# Patient Record
Sex: MALE | Race: AMERICAN INDIAN OR ALASKA NATIVE
[De-identification: names, ages, dates, MRNs, and addresses within clinical notes are randomized per-mention and may not be internally consistent; named-entity substitution may affect disease eponyms.]

---

## 2019-02-10 ENCOUNTER — HOSPITAL ENCOUNTER (INPATIENT)
Dept: HOSPITAL 5 - ED | Age: 64
LOS: 3 days | Discharge: HOME | DRG: 314 | End: 2019-02-13
Attending: INTERNAL MEDICINE | Admitting: INTERNAL MEDICINE
Payer: COMMERCIAL

## 2019-02-10 DIAGNOSIS — Z23: ICD-10-CM

## 2019-02-10 DIAGNOSIS — I11.0: ICD-10-CM

## 2019-02-10 DIAGNOSIS — J96.21: ICD-10-CM

## 2019-02-10 DIAGNOSIS — M54.9: ICD-10-CM

## 2019-02-10 DIAGNOSIS — Z72.89: ICD-10-CM

## 2019-02-10 DIAGNOSIS — I95.9: Primary | ICD-10-CM

## 2019-02-10 DIAGNOSIS — Z99.81: ICD-10-CM

## 2019-02-10 DIAGNOSIS — G89.29: ICD-10-CM

## 2019-02-10 DIAGNOSIS — F17.210: ICD-10-CM

## 2019-02-10 DIAGNOSIS — J44.1: ICD-10-CM

## 2019-02-10 DIAGNOSIS — K52.9: ICD-10-CM

## 2019-02-10 DIAGNOSIS — I50.9: ICD-10-CM

## 2019-02-10 DIAGNOSIS — Z71.6: ICD-10-CM

## 2019-02-10 DIAGNOSIS — K21.9: ICD-10-CM

## 2019-02-10 DIAGNOSIS — E11.9: ICD-10-CM

## 2019-02-10 LAB
APTT BLD: 26.5 SEC. (ref 24.2–36.6)
BASOPHILS # (AUTO): 0.1 K/MM3 (ref 0–0.1)
BASOPHILS NFR BLD AUTO: 0.8 % (ref 0–1.8)
BUN SERPL-MCNC: 11 MG/DL (ref 9–20)
BUN/CREAT SERPL: 12 %
CALCIUM SERPL-MCNC: 8.9 MG/DL (ref 8.4–10.2)
EOSINOPHIL # BLD AUTO: 0.1 K/MM3 (ref 0–0.4)
EOSINOPHIL NFR BLD AUTO: 1.5 % (ref 0–4.3)
HCT VFR BLD CALC: 46.8 % (ref 35.5–45.6)
HEMOLYSIS INDEX: 12
HGB BLD-MCNC: 16.1 GM/DL (ref 11.8–15.2)
INR PPP: 0.98 (ref 0.87–1.13)
LYMPHOCYTES # BLD AUTO: 2.6 K/MM3 (ref 1.2–5.4)
LYMPHOCYTES NFR BLD AUTO: 35.9 % (ref 13.4–35)
MCHC RBC AUTO-ENTMCNC: 35 % (ref 32–34)
MCV RBC AUTO: 96 FL (ref 84–94)
MONOCYTES # (AUTO): 0.3 K/MM3 (ref 0–0.8)
MONOCYTES % (AUTO): 4.4 % (ref 0–7.3)
PLATELET # BLD: 294 K/MM3 (ref 140–440)
RBC # BLD AUTO: 4.86 M/MM3 (ref 3.65–5.03)

## 2019-02-10 PROCEDURE — 83880 ASSAY OF NATRIURETIC PEPTIDE: CPT

## 2019-02-10 PROCEDURE — 99291 CRITICAL CARE FIRST HOUR: CPT

## 2019-02-10 PROCEDURE — 93306 TTE W/DOPPLER COMPLETE: CPT

## 2019-02-10 PROCEDURE — A9502 TC99M TETROFOSMIN: HCPCS

## 2019-02-10 PROCEDURE — 93017 CV STRESS TEST TRACING ONLY: CPT

## 2019-02-10 PROCEDURE — 81001 URINALYSIS AUTO W/SCOPE: CPT

## 2019-02-10 PROCEDURE — 84484 ASSAY OF TROPONIN QUANT: CPT

## 2019-02-10 PROCEDURE — 93010 ELECTROCARDIOGRAM REPORT: CPT

## 2019-02-10 PROCEDURE — 90686 IIV4 VACC NO PRSV 0.5 ML IM: CPT

## 2019-02-10 PROCEDURE — 96374 THER/PROPH/DIAG INJ IV PUSH: CPT

## 2019-02-10 PROCEDURE — 36415 COLL VENOUS BLD VENIPUNCTURE: CPT

## 2019-02-10 PROCEDURE — 85610 PROTHROMBIN TIME: CPT

## 2019-02-10 PROCEDURE — 80061 LIPID PANEL: CPT

## 2019-02-10 PROCEDURE — 85379 FIBRIN DEGRADATION QUANT: CPT

## 2019-02-10 PROCEDURE — 85730 THROMBOPLASTIN TIME PARTIAL: CPT

## 2019-02-10 PROCEDURE — 93005 ELECTROCARDIOGRAM TRACING: CPT

## 2019-02-10 PROCEDURE — 94640 AIRWAY INHALATION TREATMENT: CPT

## 2019-02-10 PROCEDURE — 85025 COMPLETE CBC W/AUTO DIFF WBC: CPT

## 2019-02-10 PROCEDURE — 83036 HEMOGLOBIN GLYCOSYLATED A1C: CPT

## 2019-02-10 PROCEDURE — 99406 BEHAV CHNG SMOKING 3-10 MIN: CPT

## 2019-02-10 PROCEDURE — 78452 HT MUSCLE IMAGE SPECT MULT: CPT

## 2019-02-10 PROCEDURE — 90732 PPSV23 VACC 2 YRS+ SUBQ/IM: CPT

## 2019-02-10 PROCEDURE — 71045 X-RAY EXAM CHEST 1 VIEW: CPT

## 2019-02-10 PROCEDURE — 80048 BASIC METABOLIC PNL TOTAL CA: CPT

## 2019-02-10 PROCEDURE — 87040 BLOOD CULTURE FOR BACTERIA: CPT

## 2019-02-10 PROCEDURE — 82140 ASSAY OF AMMONIA: CPT
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
## 2019-02-10 NOTE — XRAY REPORT
FINAL REPORT



EXAM:  XR CHEST 1V AP



HISTORY:  chest pain, cough 



TECHNIQUE:  upright single view chest



PRIORS:  None.



FINDINGS:  

Cardiac and mediastinal contours are unremarkable.  No focal pulmonary infiltrate is identified.  No 
pleural fluid collection seen. Pulmonary vasculature is unremarkable. 



IMPRESSION:  

Negative single-view chest

## 2019-02-11 LAB
BASOPHILS # (AUTO): 0 K/MM3 (ref 0–0.1)
BASOPHILS NFR BLD AUTO: 0.3 % (ref 0–1.8)
BILIRUB UR QL STRIP: (no result)
BLOOD UR QL VISUAL: (no result)
BUN SERPL-MCNC: 12 MG/DL (ref 9–20)
BUN/CREAT SERPL: 13 %
CALCIUM SERPL-MCNC: 7.9 MG/DL (ref 8.4–10.2)
EOSINOPHIL # BLD AUTO: 0 K/MM3 (ref 0–0.4)
EOSINOPHIL NFR BLD AUTO: 0.1 % (ref 0–4.3)
HCT VFR BLD CALC: 45.3 % (ref 35.5–45.6)
HDLC SERPL-MCNC: 48 MG/DL (ref 40–59)
HEMOLYSIS INDEX: 11
HGB BLD-MCNC: 15 GM/DL (ref 11.8–15.2)
LYMPHOCYTES # BLD AUTO: 0.6 K/MM3 (ref 1.2–5.4)
LYMPHOCYTES NFR BLD AUTO: 9.4 % (ref 13.4–35)
MCHC RBC AUTO-ENTMCNC: 33 % (ref 32–34)
MCV RBC AUTO: 97 FL (ref 84–94)
MONOCYTES # (AUTO): 0.1 K/MM3 (ref 0–0.8)
MONOCYTES % (AUTO): 1 % (ref 0–7.3)
MUCOUS THREADS #/AREA URNS HPF: (no result) /HPF
PH UR STRIP: 5 [PH] (ref 5–7)
PLATELET # BLD: 261 K/MM3 (ref 140–440)
PROT UR STRIP-MCNC: (no result) MG/DL
RBC # BLD AUTO: 4.69 M/MM3 (ref 3.65–5.03)
RBC #/AREA URNS HPF: 2 /HPF (ref 0–6)
UROBILINOGEN UR-MCNC: < 2 MG/DL (ref ?–2)
WBC #/AREA URNS HPF: < 1 /HPF (ref 0–6)

## 2019-02-11 RX ADMIN — FAMOTIDINE SCH MG: 10 INJECTION, SOLUTION INTRAVENOUS at 21:47

## 2019-02-11 RX ADMIN — BUDESONIDE SCH MG: 0.25 INHALANT RESPIRATORY (INHALATION) at 19:45

## 2019-02-11 RX ADMIN — SODIUM CHLORIDE SCH MLS/HR: 0.9 INJECTION, SOLUTION INTRAVENOUS at 04:25

## 2019-02-11 RX ADMIN — SODIUM CHLORIDE SCH MLS/HR: 0.9 INJECTION, SOLUTION INTRAVENOUS at 15:08

## 2019-02-11 RX ADMIN — Medication SCH ML: at 22:14

## 2019-02-11 RX ADMIN — FAMOTIDINE SCH MG: 10 INJECTION, SOLUTION INTRAVENOUS at 04:41

## 2019-02-11 RX ADMIN — PIPERACILLIN AND TAZOBACTAM SCH MLS/HR: 4; .5 INJECTION, POWDER, FOR SOLUTION INTRAVENOUS at 04:25

## 2019-02-11 RX ADMIN — PIPERACILLIN AND TAZOBACTAM SCH MLS/HR: 4; .5 INJECTION, POWDER, FOR SOLUTION INTRAVENOUS at 15:04

## 2019-02-11 RX ADMIN — BUDESONIDE SCH: 0.25 INHALANT RESPIRATORY (INHALATION) at 11:15

## 2019-02-11 RX ADMIN — PIPERACILLIN AND TAZOBACTAM SCH MLS/HR: 4; .5 INJECTION, POWDER, FOR SOLUTION INTRAVENOUS at 21:44

## 2019-02-11 RX ADMIN — FAMOTIDINE SCH MG: 10 INJECTION, SOLUTION INTRAVENOUS at 15:05

## 2019-02-11 RX ADMIN — Medication SCH ML: at 15:05

## 2019-02-11 NOTE — HISTORY AND PHYSICAL REPORT
<SAINT-ARIEL,HURLINE - Last Filed: 02/11/19 05:53>





History of Present Illness


Date of examination: 02/11/19


Date of admission: 


02/11/1919


Chief complaint: 





Chest pain and hypotension


History of present illness: 





Patient 63-year-old male with history of COPD , cigarette smoking disorder, 

hypotension who presents to the ER with complaints of chest pain for 3 days.  

Patient states that he has been coughing on and off for 2 weeks, it is a prod

uctive cough that is worse in the morning, the cough is now associated with 

chest pain.  Patient reports that the chest pain is located in the left 

substernal area, radiating to his left hand, he states that he experiences 

numbness and tingling and bluish discoloration of his left hand.  Patient states

that the left hand numbness worsen when ist cold.  Patient also reports a 

history of tobacco use for several years he currently smokes about 12 cigarettes

a day.  Patient also reports a left-sided headache, denies visual changes, 

denies shortness of breath, denies diaphoresis, denies nausea or vomiting, 

denies fever, denies chills.  Patient reports episodes of diarrhea/vomiting on 

and off for several months. Patient states the diarrhea has been ongoing for 

several months with no change.  In the ER patient had a chest x-ray that was 

normal, his blood pressure is 74/39, improved to 96/55. Pt's EKG was normal, 

cardiac enzymes were negative.  Patient is admitted for COPD exacerbation, chest

pain and hypotension. 





Medications and Allergies


                                    Allergies











Allergy/AdvReac Type Severity Reaction Status Date / Time


 


No Known Allergies Allergy   Verified 01/03/14 11:20











                                Home Medications











 Medication  Instructions  Recorded  Confirmed  Last Taken  Type


 


Fluticasone/Salmeterol [Advair 1 each IH BID #1 blst.w.dev 02/13/19  Unknown Rx





250-50 Diskus]     


 


Prednisone [predniSONE 10 mg 10 mg PO .TAPER #1 tab.ds.pk 02/13/19  Unknown Rx





(6-Day Pack, 21 Tabs)]     


 


RX: ALBUTEROL Inhaler(NF) 2 puff IH QID PRN #1 inha 02/13/19  Unknown Rx





[VENTOLIN Inhaler(NF)]     


 


RX: Famotidine [Pepcid] 20 mg PO BID #30 tablet 02/13/19  Unknown Rx


 


RX: Nicotine [Habitrol] 14 mg TD DAILY #30 patch 02/13/19  Unknown Rx


 


RX: levoFLOXacin [Levaquin TAB] 750 mg PO Q24HR@2200 #5 tablet 02/13/19  Unknown

Rx











Active Meds: 


Active Medications





Acetaminophen (Tylenol)  650 mg PO Q4H PRN


   PRN Reason: Pain MILD(1-3)/Fever >100.5/HA


Aspirin (Ecotrin)  325 mg PO QDAY PK


Morphine Sulfate (Morphine)  2 mg IV Q4H PRN


   PRN Reason: Pain, Moderate (4-6)


Ondansetron HCl (Zofran)  4 mg IV Q8H PRN


   PRN Reason: Nausea And Vomiting


Sodium Chloride (Sodium Chloride Flush Syringe 10 Ml)  10 ml IV BID PK


Sodium Chloride (Sodium Chloride Flush Syringe 10 Ml)  10 ml IV PRN PRN


   PRN Reason: LINE FLUSH


Sodium Chloride (Sodium Chloride Flush Syringe 10 Ml)  10 ml IV PRN PRN


   PRN Reason: LINE FLUSH











Exam





- Constitutional


Vitals: 


                                        











Temp Pulse Resp BP Pulse Ox


 


 98.7 F   77   17   106/62   95 


 


 02/10/19 21:50  02/11/19 02:00  02/11/19 02:00  02/11/19 02:00  02/11/19 02:00











General appearance: Present: no acute distress





- EENT


Eyes: Present: PERRL, EOM intact


ENT: hearing intact, clear oral mucosa





- Neck


Neck: Present: supple, normal ROM





- Respiratory


Respiratory effort: normal


Respiratory: bilateral: diminished





- Cardiovascular


Rhythm: regular


Heart Sounds: Present: gallop





- Extremities


Extremities: no ischemia


Peripheral Pulses: within normal limits





- Abdominal


General gastrointestinal: Present: soft, non-tender


Male genitourinary: Present: deferred





- Rectal


Rectal Exam: deferred





- Integumentary


Integumentary: Present: warm, dry





- Musculoskeletal


Musculoskeletal: strength equal bilaterally





- Psychiatric


Psychiatric: appropriate mood/affect, cooperative





- Neurologic


Neurologic: moves all extremities





Results





- Labs


CBC & Chem 7: 


                                 02/11/19 03:20





                                 02/11/19 03:20


Labs: 


                             Laboratory Last Values











WBC  7.1 K/mm3 (4.5-11.0)   02/10/19  22:07    


 


RBC  4.86 M/mm3 (3.65-5.03)   02/10/19  22:07    


 


Hgb  16.1 gm/dl (11.8-15.2)  H  02/10/19  22:07    


 


Hct  46.8 % (35.5-45.6)  H  02/10/19  22:07    


 


MCV  96 fl (84-94)  H  02/10/19  22:07    


 


MCH  33 pg (28-32)  H  02/10/19  22:07    


 


MCHC  35 % (32-34)  H  02/10/19  22:07    


 


RDW  13.5 % (13.2-15.2)   02/10/19  22:07    


 


Plt Count  294 K/mm3 (140-440)   02/10/19  22:07    


 


Lymph % (Auto)  35.9 % (13.4-35.0)  H  02/10/19  22:07    


 


Mono % (Auto)  4.4 % (0.0-7.3)   02/10/19  22:07    


 


Eos % (Auto)  1.5 % (0.0-4.3)   02/10/19  22:07    


 


Baso % (Auto)  0.8 % (0.0-1.8)   02/10/19  22:07    


 


Lymph #  2.6 K/mm3 (1.2-5.4)   02/10/19  22:07    


 


Mono #  0.3 K/mm3 (0.0-0.8)   02/10/19  22:07    


 


Eos #  0.1 K/mm3 (0.0-0.4)   02/10/19  22:07    


 


Baso #  0.1 K/mm3 (0.0-0.1)   02/10/19  22:07    


 


Seg Neutrophils %  57.4 % (40.0-70.0)   02/10/19  22:07    


 


Seg Neutrophils #  4.1 K/mm3 (1.8-7.7)   02/10/19  22:07    


 


PT  13.4 Sec. (12.2-14.9)   02/10/19  22:07    


 


INR  0.98  (0.87-1.13)   02/10/19  22:07    


 


APTT  26.5 Sec. (24.2-36.6)   02/10/19  22:07    


 


D-Dimer  196.69 ng/mlDDU (0-234)   02/10/19  22:07    


 


Sodium  141 mmol/L (137-145)   02/10/19  22:07    


 


Potassium  4.1 mmol/L (3.6-5.0)   02/10/19  22:07    


 


Chloride  103.9 mmol/L ()   02/10/19  22:07    


 


Carbon Dioxide  20 mmol/L (22-30)  L  02/10/19  22:07    


 


Anion Gap  21 mmol/L  02/10/19  22:07    


 


BUN  11 mg/dL (9-20)   02/10/19  22:07    


 


Creatinine  0.9 mg/dL (0.8-1.5)   02/10/19  22:07    


 


Estimated GFR  > 60 ml/min  02/10/19  22:07    


 


BUN/Creatinine Ratio  12 %  02/10/19  22:07    


 


Glucose  91 mg/dL ()   02/10/19  22:07    


 


Lactic Acid  4.20 mmol/L (0.7-2.0)  H*  02/11/19  02:04    


 


Calcium  8.9 mg/dL (8.4-10.2)   02/10/19  22:07    


 


Troponin T  < 0.010 ng/mL (0.00-0.029)   02/11/19  02:04    


 


NT-Pro-B Natriuret Pep  2388 pg/mL (0-900)  H  02/10/19  22:07    














Assessment and Plan


Assessment and plan: 





1.  Atypical chest pain, rule out ACS


2.  Hypotension


3.  COPD exacerbation


4.  Cigarette smoking disorder


5.  Chronic diarrhea etiology unclear





Plan: 


Patient is admitted for chest pain/


Continue cardiac enzymes every 6 hours 2


Monitor telemetry /vital signs


Smoking cessation 


Monitor BP


Stress test in a.m.


COPD exacerbation protocol


Solu-Medrol IV 60 mg every 6 hours


Patient has no home meds


Plan discussed with patient, voiced understanding


Patient's condition and plan of care discussed with Dr. Murry


Advance Directives: Yes


VTE prophylaxis?: Chemical


Plan of care discussed with patient/family: Yes





<COLE MURRY - Last Filed: 02/21/19 06:47>





History of Present Illness


Date of admission: 


02/11/19 04:15








Medications and Allergies


Active Meds: 


Active Medications





Acetaminophen (Tylenol)  650 mg PO Q4H PRN


   PRN Reason: Pain MILD(1-3)/Fever >100.5/HA


Aspirin (Ecotrin)  325 mg PO QDAY PK


Famotidine (Pepcid)  20 mg IV BID PK


   Last Admin: 02/11/19 04:41 Dose:  20 mg


   Documented by: 


Sodium Chloride (Nacl 0.9% 1000 Ml)  1,000 mls @ 125 mls/hr IV AS DIRECT PK


   Last Admin: 02/11/19 04:25 Dose:  125 mls/hr


   Documented by: 


Piperacillin Sod/Tazobactam Sod (Zosyn/Ns 4.5gm/100ml)  4.5 gm in 100 mls @ 200 

mls/hr IV Q8H PK; Protocol


   Last Admin: 02/11/19 04:25 Dose:  200 mls/hr


   Documented by: 


Morphine Sulfate (Morphine)  2 mg IV Q4H PRN


   PRN Reason: Pain, Moderate (4-6)


Ondansetron HCl (Zofran)  4 mg IV Q8H PRN


   PRN Reason: Nausea And Vomiting


Sodium Chloride (Sodium Chloride Flush Syringe 10 Ml)  10 ml IV BID PK


Sodium Chloride (Sodium Chloride Flush Syringe 10 Ml)  10 ml IV PRN PRN


   PRN Reason: LINE FLUSH


Sodium Chloride (Sodium Chloride Flush Syringe 10 Ml)  10 ml IV PRN PRN


   PRN Reason: LINE FLUSH











Exam





- Constitutional


Vitals: 


                                        











Temp Pulse Resp BP Pulse Ox


 


 98.7 F   81   16   119/64   97 


 


 02/10/19 21:50  02/11/19 06:00  02/11/19 06:00  02/11/19 06:00  02/11/19 06:00














Results





- Labs


CBC & Chem 7: 


                                 02/11/19 03:20





                                 02/11/19 03:20


Labs: 


                             Laboratory Last Values











WBC  6.4 K/mm3 (4.5-11.0)   02/11/19  03:20    


 


RBC  4.69 M/mm3 (3.65-5.03)   02/11/19  03:20    


 


Hgb  15.0 gm/dl (11.8-15.2)   02/11/19  03:20    


 


Hct  45.3 % (35.5-45.6)   02/11/19  03:20    


 


MCV  97 fl (84-94)  H  02/11/19  03:20    


 


MCH  32 pg (28-32)   02/11/19  03:20    


 


MCHC  33 % (32-34)   02/11/19  03:20    


 


RDW  13.6 % (13.2-15.2)   02/11/19  03:20    


 


Plt Count  261 K/mm3 (140-440)   02/11/19  03:20    


 


Lymph % (Auto)  9.4 % (13.4-35.0)  L  02/11/19  03:20    


 


Mono % (Auto)  1.0 % (0.0-7.3)   02/11/19  03:20    


 


Eos % (Auto)  0.1 % (0.0-4.3)   02/11/19  03:20    


 


Baso % (Auto)  0.3 % (0.0-1.8)   02/11/19  03:20    


 


Lymph #  0.6 K/mm3 (1.2-5.4)  L  02/11/19  03:20    


 


Mono #  0.1 K/mm3 (0.0-0.8)   02/11/19  03:20    


 


Eos #  0.0 K/mm3 (0.0-0.4)   02/11/19  03:20    


 


Baso #  0.0 K/mm3 (0.0-0.1)   02/11/19  03:20    


 


Seg Neutrophils %  89.2 % (40.0-70.0)  H  02/11/19  03:20    


 


Seg Neutrophils #  5.7 K/mm3 (1.8-7.7)   02/11/19  03:20    


 


PT  13.4 Sec. (12.2-14.9)   02/10/19  22:07    


 


INR  0.98  (0.87-1.13)   02/10/19  22:07    


 


APTT  26.5 Sec. (24.2-36.6)   02/10/19  22:07    


 


D-Dimer  196.69 ng/mlDDU (0-234)   02/10/19  22:07    


 


Sodium  141 mmol/L (137-145)   02/11/19  03:20    


 


Potassium  4.3 mmol/L (3.6-5.0)   02/11/19  03:20    


 


Chloride  105.7 mmol/L ()   02/11/19  03:20    


 


Carbon Dioxide  21 mmol/L (22-30)  L  02/11/19  03:20    


 


Anion Gap  19 mmol/L  02/11/19  03:20    


 


BUN  12 mg/dL (9-20)   02/11/19  03:20    


 


Creatinine  0.9 mg/dL (0.8-1.5)   02/11/19  03:20    


 


Estimated GFR  > 60 ml/min  02/11/19  03:20    


 


BUN/Creatinine Ratio  13 %  02/11/19  03:20    


 


Glucose  112 mg/dL ()  H  02/11/19  03:20    


 


Hemoglobin A1c  5.6 % (4-6)   02/11/19  03:20    


 


Lactic Acid  4.50 mmol/L (0.7-2.0)  H*  02/11/19  03:20    


 


Calcium  7.9 mg/dL (8.4-10.2)  L  02/11/19  03:20    


 


Troponin T  < 0.010 ng/mL (0.00-0.029)   02/11/19  02:04    


 


NT-Pro-B Natriuret Pep  2388 pg/mL (0-900)  H  02/10/19  22:07    


 


Triglycerides  217 mg/dL (2-149)  H  02/11/19  03:20    


 


Cholesterol  119 mg/dL ()   02/11/19  03:20    


 


LDL Cholesterol Direct  49 mg/dL ()  L  02/11/19  03:20    


 


HDL Cholesterol  48 mg/dL (40-59)   02/11/19  03:20    


 


Cholesterol/HDL Ratio  2.47 %  02/11/19  03:20    


 


Urine Color  Yellow  (Yellow)   02/11/19  04:23    


 


Urine Turbidity  Clear  (Clear)   02/11/19  04:23    


 


Urine pH  5.0  (5.0-7.0)   02/11/19  04:23    


 


Ur Specific Gravity  1.008  (1.003-1.030)   02/11/19  04:23    


 


Urine Protein  <15 mg/dl mg/dL (Negative)   02/11/19  04:23    


 


Urine Glucose (UA)  Neg mg/dL (Negative)   02/11/19  04:23    


 


Urine Ketones  Tr mg/dL (Negative)   02/11/19  04:23    


 


Urine Blood  Neg  (Negative)   02/11/19  04:23    


 


Urine Nitrite  Neg  (Negative)   02/11/19  04:23    


 


Urine Bilirubin  Neg  (Negative)   02/11/19  04:23    


 


Urine Urobilinogen  < 2.0 mg/dL (<2.0)   02/11/19  04:23    


 


Ur Leukocyte Esterase  Neg  (Negative)   02/11/19  04:23    


 


Urine WBC (Auto)  < 1.0 /HPF (0.0-6.0)   02/11/19  04:23    


 


Urine RBC (Auto)  2.0 /HPF (0.0-6.0)   02/11/19  04:23    


 


Urine Mucus  Few /HPF  02/11/19  04:23    














Assessment and Plan


Assessment and plan: 


Patient seen and examined with nurse practitioner. 63-year-old man with history 

of diabetes, COPD complains ofchest pain she describes as sharp and intermittent

to 30 minutes and associated shortness of breath.  In the emergency room he was 

found to be hypotensive which he responded to IV fluid.  His lactate is 

elevated, no signs of infection, will start empiric IV zosyn, IV fluid, obtain 

stress test

## 2019-02-11 NOTE — PROGRESS NOTE
Assessment and Plan


Assessment and plan: 


--Atypical chest pain, rule out ACS


Stress test negative, probably noncardiac chest pain, secondary to GERD





--GERD; advise Protonix, supportive care





-- Hypotension; present on admission, resolved


Blood pressures reasonable level closely monitor





--Acute on chronic hypoxic respiratory failure;


Patient has home oxygen, secondary to COPD exacerbation


Oxygen titrate to O2 sats more than 90%, nebulizers, IV steroids





--Acute exacerbation of COPD; nebulizers, tapering dose of IV steroids, IV 

antibiotics


Inhalation steroids, pulmonary consult if needed








--Ongoing tobacco use; smoking cessation advised, nicotine patch as needed 





--Chronic diarrhea etiology unclear; mild improvement, patient may need to see 

GI


As outpatient if no improvement upon discharge





--DVT prophylaxis; Lovenox 





close closely monitor the patient had chest management as needed


Possible discharge in 1-2 days if stable


Plan of care is reviewed with the patient and his nurse





We will transfer patient  to medical floor




















History


Interval history: 


Patient seen and examined medical records reviewed


Complaints of shortness of breath and wheezing


Admitted with chest pain, stress test negative


Alert awake oriented 3


Mild distress


Vital signs noted








Hospitalist Physical





- Constitutional


Vitals: 


                                        











Temp Pulse Resp BP Pulse Ox


 


 98.9 F   89   21   114/66   99 


 


 02/11/19 07:37  02/11/19 07:37  02/11/19 07:37  02/11/19 07:37  02/11/19 07:37











General appearance: Present: no acute distress, well-nourished





- EENT


Eyes: Present: PERRL, EOM intact





- Neck


Neck: Present: supple, normal ROM





- Respiratory


Respiratory effort: normal


Respiratory: bilateral: diminished, wheezing, negative: rales, rhonchi





- Cardiovascular


Rhythm: regular


Heart Sounds: Present: S1 & S2





- Extremities


Extremities: no ischemia, No edema


Peripheral Pulses: within normal limits





- Abdominal


General gastrointestinal: soft, non-tender, non-distended, normal bowel sounds





- Integumentary


Integumentary: Present: clear, warm





- Psychiatric


Psychiatric: appropriate mood/affect, cooperative





- Neurologic


Neurologic: CNII-XII intact, moves all extremities





Results





- Labs


CBC & Chem 7: 


                                 02/11/19 03:20





                                 02/11/19 03:20


Labs: 


                             Laboratory Last Values











WBC  6.4 K/mm3 (4.5-11.0)   02/11/19  03:20    


 


RBC  4.69 M/mm3 (3.65-5.03)   02/11/19  03:20    


 


Hgb  15.0 gm/dl (11.8-15.2)   02/11/19  03:20    


 


Hct  45.3 % (35.5-45.6)   02/11/19  03:20    


 


MCV  97 fl (84-94)  H  02/11/19  03:20    


 


MCH  32 pg (28-32)   02/11/19  03:20    


 


MCHC  33 % (32-34)   02/11/19  03:20    


 


RDW  13.6 % (13.2-15.2)   02/11/19  03:20    


 


Plt Count  261 K/mm3 (140-440)   02/11/19  03:20    


 


Lymph % (Auto)  9.4 % (13.4-35.0)  L  02/11/19  03:20    


 


Mono % (Auto)  1.0 % (0.0-7.3)   02/11/19  03:20    


 


Eos % (Auto)  0.1 % (0.0-4.3)   02/11/19  03:20    


 


Baso % (Auto)  0.3 % (0.0-1.8)   02/11/19  03:20    


 


Lymph #  0.6 K/mm3 (1.2-5.4)  L  02/11/19  03:20    


 


Mono #  0.1 K/mm3 (0.0-0.8)   02/11/19  03:20    


 


Eos #  0.0 K/mm3 (0.0-0.4)   02/11/19  03:20    


 


Baso #  0.0 K/mm3 (0.0-0.1)   02/11/19  03:20    


 


Seg Neutrophils %  89.2 % (40.0-70.0)  H  02/11/19  03:20    


 


Seg Neutrophils #  5.7 K/mm3 (1.8-7.7)   02/11/19  03:20    


 


PT  13.4 Sec. (12.2-14.9)   02/10/19  22:07    


 


INR  0.98  (0.87-1.13)   02/10/19  22:07    


 


APTT  26.5 Sec. (24.2-36.6)   02/10/19  22:07    


 


D-Dimer  196.69 ng/mlDDU (0-234)   02/10/19  22:07    


 


Sodium  141 mmol/L (137-145)   02/11/19  03:20    


 


Potassium  4.3 mmol/L (3.6-5.0)   02/11/19  03:20    


 


Chloride  105.7 mmol/L ()   02/11/19  03:20    


 


Carbon Dioxide  21 mmol/L (22-30)  L  02/11/19  03:20    


 


Anion Gap  19 mmol/L  02/11/19  03:20    


 


BUN  12 mg/dL (9-20)   02/11/19  03:20    


 


Creatinine  0.9 mg/dL (0.8-1.5)   02/11/19  03:20    


 


Estimated GFR  > 60 ml/min  02/11/19  03:20    


 


BUN/Creatinine Ratio  13 %  02/11/19  03:20    


 


Glucose  112 mg/dL ()  H  02/11/19  03:20    


 


Hemoglobin A1c  5.6 % (4-6)   02/11/19  03:20    


 


Lactic Acid  1.60 mmol/L (0.7-2.0)   02/11/19  07:43    


 


Calcium  7.9 mg/dL (8.4-10.2)  L  02/11/19  03:20    


 


Troponin T  < 0.010 ng/mL (0.00-0.029)   02/11/19  02:04    


 


NT-Pro-B Natriuret Pep  2388 pg/mL (0-900)  H  02/10/19  22:07    


 


Triglycerides  217 mg/dL (2-149)  H  02/11/19  03:20    


 


Cholesterol  119 mg/dL ()   02/11/19  03:20    


 


LDL Cholesterol Direct  49 mg/dL ()  L  02/11/19  03:20    


 


HDL Cholesterol  48 mg/dL (40-59)   02/11/19  03:20    


 


Cholesterol/HDL Ratio  2.47 %  02/11/19  03:20    


 


Urine Color  Yellow  (Yellow)   02/11/19  04:23    


 


Urine Turbidity  Clear  (Clear)   02/11/19  04:23    


 


Urine pH  5.0  (5.0-7.0)   02/11/19  04:23    


 


Ur Specific Gravity  1.008  (1.003-1.030)   02/11/19  04:23    


 


Urine Protein  <15 mg/dl mg/dL (Negative)   02/11/19  04:23    


 


Urine Glucose (UA)  Neg mg/dL (Negative)   02/11/19  04:23    


 


Urine Ketones  Tr mg/dL (Negative)   02/11/19  04:23    


 


Urine Blood  Neg  (Negative)   02/11/19  04:23    


 


Urine Nitrite  Neg  (Negative)   02/11/19  04:23    


 


Urine Bilirubin  Neg  (Negative)   02/11/19  04:23    


 


Urine Urobilinogen  < 2.0 mg/dL (<2.0)   02/11/19  04:23    


 


Ur Leukocyte Esterase  Neg  (Negative)   02/11/19  04:23    


 


Urine WBC (Auto)  < 1.0 /HPF (0.0-6.0)   02/11/19  04:23    


 


Urine RBC (Auto)  2.0 /HPF (0.0-6.0)   02/11/19  04:23    


 


Urine Mucus  Few /HPF  02/11/19  04:23

## 2019-02-12 PROCEDURE — 3E0234Z INTRODUCTION OF SERUM, TOXOID AND VACCINE INTO MUSCLE, PERCUTANEOUS APPROACH: ICD-10-PCS | Performed by: INTERNAL MEDICINE

## 2019-02-12 RX ADMIN — ASPIRIN SCH MG: 325 TABLET, COATED ORAL at 09:50

## 2019-02-12 RX ADMIN — SODIUM CHLORIDE SCH MLS/HR: 0.9 INJECTION, SOLUTION INTRAVENOUS at 09:49

## 2019-02-12 RX ADMIN — BUDESONIDE SCH MG: 0.25 INHALANT RESPIRATORY (INHALATION) at 10:01

## 2019-02-12 RX ADMIN — FAMOTIDINE SCH MG: 10 INJECTION, SOLUTION INTRAVENOUS at 09:50

## 2019-02-12 RX ADMIN — Medication SCH ML: at 22:35

## 2019-02-12 RX ADMIN — SODIUM CHLORIDE SCH MLS/HR: 0.9 INJECTION, SOLUTION INTRAVENOUS at 02:39

## 2019-02-12 RX ADMIN — FAMOTIDINE SCH MG: 20 TABLET ORAL at 22:36

## 2019-02-12 RX ADMIN — Medication SCH ML: at 09:49

## 2019-02-12 RX ADMIN — PIPERACILLIN AND TAZOBACTAM SCH MLS/HR: 4; .5 INJECTION, POWDER, FOR SOLUTION INTRAVENOUS at 04:06

## 2019-02-12 RX ADMIN — BUDESONIDE SCH MG: 0.25 INHALANT RESPIRATORY (INHALATION) at 19:00

## 2019-02-12 NOTE — PROGRESS NOTE
Assessment and Plan


Assessment and plan: 


--Acute on chronic hypoxic respiratory failure;


Patient has home oxygen, secondary to COPD exacerbation


Oxygen titrate to O2 sats more than 90%, nebulizers, tapering doses IV steroids


And IV Lasix, DC IV fluids





--Acute exacerbation of COPD; nebulizers, tapering dose of IV steroids, 


Change to oral antibiotics, Inhalation steroids, 





--Atypical chest pain, rule out ACS


Stress test negative, probably noncardiac chest pain, secondary to GERD





--GERD; advise Protonix, supportive care





-- Hypotension; present on admission, resolved


Blood pressures reasonable level closely monitor





--Ongoing tobacco use; smoking cessation advised, nicotine patch as needed





--Chronic diarrhea etiology unclear; mild improvement, patient may need to see 

GI


As outpatient if no improvement upon discharge





--DVT prophylaxis; Lovenox 





Plan of care is reviewed with the patient and his nurse


Ambulate as tolerated


Patient already has home oxygen


Possible discharge in 1-2 days stable























History


Interval history: 


Patient seen and examined medical records reviewed


No new events reported by the nursing staff


Patient continues to have shortness of breath and chest congestion


Alert awake oriented 3 in mild distress


Vital signs noted








Hospitalist Physical





- Constitutional


Vitals: 


                                        











Temp Pulse Resp BP Pulse Ox


 


 98.1 F   79   18   129/74   99 


 


 02/12/19 06:24  02/12/19 10:08  02/12/19 10:08  02/12/19 06:24  02/12/19 09:58











General appearance: Present: mild distress, well-nourished





- EENT


Eyes: Present: PERRL, EOM intact





- Neck


Neck: Present: supple, normal ROM





- Respiratory


Respiratory effort: normal


Respiratory: bilateral: diminished, rales, wheezing, negative: rhonchi





- Cardiovascular


Rhythm: regular


Heart Sounds: Present: S1 & S2





- Extremities


Extremities: no ischemia, No edema





- Abdominal


General gastrointestinal: soft, non-tender, non-distended, normal bowel sounds





- Integumentary


Integumentary: Present: clear, warm





- Psychiatric


Psychiatric: appropriate mood/affect, cooperative





- Neurologic


Neurologic: CNII-XII intact, moves all extremities





Results





- Labs


CBC & Chem 7: 


                                 02/11/19 03:20





                                 02/11/19 03:20


Labs: 


                             Laboratory Last Values











WBC  6.4 K/mm3 (4.5-11.0)   02/11/19  03:20    


 


RBC  4.69 M/mm3 (3.65-5.03)   02/11/19  03:20    


 


Hgb  15.0 gm/dl (11.8-15.2)   02/11/19  03:20    


 


Hct  45.3 % (35.5-45.6)   02/11/19  03:20    


 


MCV  97 fl (84-94)  H  02/11/19  03:20    


 


MCH  32 pg (28-32)   02/11/19  03:20    


 


MCHC  33 % (32-34)   02/11/19  03:20    


 


RDW  13.6 % (13.2-15.2)   02/11/19  03:20    


 


Plt Count  261 K/mm3 (140-440)   02/11/19  03:20    


 


Lymph % (Auto)  9.4 % (13.4-35.0)  L  02/11/19  03:20    


 


Mono % (Auto)  1.0 % (0.0-7.3)   02/11/19  03:20    


 


Eos % (Auto)  0.1 % (0.0-4.3)   02/11/19  03:20    


 


Baso % (Auto)  0.3 % (0.0-1.8)   02/11/19  03:20    


 


Lymph #  0.6 K/mm3 (1.2-5.4)  L  02/11/19  03:20    


 


Mono #  0.1 K/mm3 (0.0-0.8)   02/11/19  03:20    


 


Eos #  0.0 K/mm3 (0.0-0.4)   02/11/19  03:20    


 


Baso #  0.0 K/mm3 (0.0-0.1)   02/11/19  03:20    


 


Seg Neutrophils %  89.2 % (40.0-70.0)  H  02/11/19  03:20    


 


Seg Neutrophils #  5.7 K/mm3 (1.8-7.7)   02/11/19  03:20    


 


PT  13.4 Sec. (12.2-14.9)   02/10/19  22:07    


 


INR  0.98  (0.87-1.13)   02/10/19  22:07    


 


APTT  26.5 Sec. (24.2-36.6)   02/10/19  22:07    


 


D-Dimer  196.69 ng/mlDDU (0-234)   02/10/19  22:07    


 


Sodium  141 mmol/L (137-145)   02/11/19  03:20    


 


Potassium  4.3 mmol/L (3.6-5.0)   02/11/19  03:20    


 


Chloride  105.7 mmol/L ()   02/11/19  03:20    


 


Carbon Dioxide  21 mmol/L (22-30)  L  02/11/19  03:20    


 


Anion Gap  19 mmol/L  02/11/19  03:20    


 


BUN  12 mg/dL (9-20)   02/11/19  03:20    


 


Creatinine  0.9 mg/dL (0.8-1.5)   02/11/19  03:20    


 


Estimated GFR  > 60 ml/min  02/11/19  03:20    


 


BUN/Creatinine Ratio  13 %  02/11/19  03:20    


 


Glucose  112 mg/dL ()  H  02/11/19  03:20    


 


Hemoglobin A1c  5.6 % (4-6)   02/11/19  03:20    


 


Lactic Acid  1.60 mmol/L (0.7-2.0)   02/11/19  07:43    


 


Calcium  7.9 mg/dL (8.4-10.2)  L  02/11/19  03:20    


 


Troponin T  < 0.010 ng/mL (0.00-0.029)   02/11/19  17:47    


 


NT-Pro-B Natriuret Pep  2388 pg/mL (0-900)  H  02/10/19  22:07    


 


Triglycerides  217 mg/dL (2-149)  H  02/11/19  03:20    


 


Cholesterol  119 mg/dL ()   02/11/19  03:20    


 


LDL Cholesterol Direct  49 mg/dL ()  L  02/11/19  03:20    


 


HDL Cholesterol  48 mg/dL (40-59)   02/11/19  03:20    


 


Cholesterol/HDL Ratio  2.47 %  02/11/19  03:20    


 


Urine Color  Yellow  (Yellow)   02/11/19  04:23    


 


Urine Turbidity  Clear  (Clear)   02/11/19  04:23    


 


Urine pH  5.0  (5.0-7.0)   02/11/19  04:23    


 


Ur Specific Gravity  1.008  (1.003-1.030)   02/11/19  04:23    


 


Urine Protein  <15 mg/dl mg/dL (Negative)   02/11/19  04:23    


 


Urine Glucose (UA)  Neg mg/dL (Negative)   02/11/19  04:23    


 


Urine Ketones  Tr mg/dL (Negative)   02/11/19  04:23    


 


Urine Blood  Neg  (Negative)   02/11/19  04:23    


 


Urine Nitrite  Neg  (Negative)   02/11/19  04:23    


 


Urine Bilirubin  Neg  (Negative)   02/11/19  04:23    


 


Urine Urobilinogen  < 2.0 mg/dL (<2.0)   02/11/19  04:23    


 


Ur Leukocyte Esterase  Neg  (Negative)   02/11/19  04:23    


 


Urine WBC (Auto)  < 1.0 /HPF (0.0-6.0)   02/11/19  04:23    


 


Urine RBC (Auto)  2.0 /HPF (0.0-6.0)   02/11/19  04:23    


 


Urine Mucus  Few /HPF  02/11/19  04:23

## 2019-02-13 VITALS — SYSTOLIC BLOOD PRESSURE: 118 MMHG | DIASTOLIC BLOOD PRESSURE: 65 MMHG

## 2019-02-13 RX ADMIN — BUDESONIDE SCH MG: 0.25 INHALANT RESPIRATORY (INHALATION) at 09:00

## 2019-02-13 RX ADMIN — FAMOTIDINE SCH MG: 20 TABLET ORAL at 10:15

## 2019-02-13 RX ADMIN — ASPIRIN SCH MG: 325 TABLET, COATED ORAL at 10:14

## 2019-02-13 RX ADMIN — Medication SCH ML: at 10:15

## 2019-02-13 NOTE — TREADMILL REPORT
INDICATION:  Chest pain.



ORDERING PHYSICIAN:   _____.



FINDINGS:  There is no scintigraphic evidence of myocardial ischemia.  The left

ventricle is normal in size and systolic function.  The left ventricular

ejection fraction is measured at 66%.  Normal wall motion and wall thickening is

noted on gated imaging.



CONCLUSION:  Normal perfusion scan.





DD: 02/13/2019 08:26

DT: 02/13/2019 09:20

JOB# [id number]  [id number]

FARHAN/JOE

## 2019-02-13 NOTE — DISCHARGE SUMMARY
Providers





- Providers


Date of Admission: 


02/11/19 04:15





Date of discharge: 02/13/19


Attending physician: 


AMY J KOCHERLA





                                        





02/11/19


Consult to Cardiac Rehabilitation [CONS] Routine 


   Reason For Exam: Phase I


Consult to Cardiac Rehabilitation [CONS] Routine 


   Reason For Exam: Phase I











Primary care physician: 


WENDI CORTEZ








Hospitalization


Reason for admission: chest pain/worsening shortness of breath


Condition: Fair


Pertinent studies: 


Chest x-ray; normal


Echocardiogram; diastolic congestive heart failure, EF 80-85%, LVH


Stress test; normal perfusion scan, EF 65%





Hospital course: 


-Very pleasant 63-year-old male patient with significant history of COPD ongoing

 tobacco use 


hypertension was admitted through emergency room with chest pain and mild 

hypotension


Patient was evaluated admitted to the hospital had extensive evaluation as 

mentioned above


Echocardiogram consistent with diastolic congestive heart failure


Stress test negative for ischemia


Patient's medications optimized


Today's comfortable no new complaints, Vital signs stable


Physical examination unremarkable


Hemodynamically and clinically stable for discharge








Discharge diagnosis:


-Acute on chronic hypoxic respiratory failure;


Patient has home oxygen, secondary to COPD exacerbation


Oxygen titrate to O2 sats more than 90%, nebulizers, tapering doses IV steroids


And IV Lasix, DC IV fluids





--Acute exacerbation of COPD; nebulizers, tapering dose of IV steroids, 


Change to oral antibiotics, Inhalation steroids, 





--Atypical chest pain, rule out ACS


Stress test negative, probably noncardiac chest pain, secondary to GERD





--GERD; advise Protonix, supportive care





--Acute diastolic congestive heart failure; EF 80%, continue anti-failure 

medications





-- Hypotension; present on admission, resolved


Blood pressures reasonable level closely monitor





--Ongoing tobacco use; smoking cessation advised, nicotine patch as needed





--Chronic diarrhea etiology unclear; mild improvement, patient may need to see 

GI


As outpatient if no improvement upon discharge








Disposition: DC-01 TO HOME OR SELFCARE


Time spent for discharge: 32 min





Core Measure Documentation





- Palliative Care


Palliative Care/ Comfort Measures: Not Applicable





- Core Measures


Any of the following diagnoses?: none





Exam





- Constitutional


Vitals: 


                                        











Temp Pulse Resp BP Pulse Ox


 


 98.5 F   91 H  20   118/65   99 


 


 02/13/19 12:55  02/13/19 12:55  02/13/19 12:55  02/13/19 12:55  02/13/19 12:55











General appearance: Present: no acute distress, well-nourished





- EENT


Eyes: Present: PERRL, EOM intact





- Neck


Neck: Present: supple, normal ROM





- Respiratory


Respiratory effort: normal


Respiratory: bilateral: diminished, negative: rales, rhonchi, wheezing





- Cardiovascular


Rhythm: regular


Heart Sounds: Present: S1 & S2





- Extremities


Extremities: no ischemia, No edema





- Abdominal


General gastrointestinal: Present: soft, non-tender, non-distended, normal bowel

 sounds





- Integumentary


Integumentary: Present: clear, warm





- Musculoskeletal


Musculoskeletal: strength equal bilaterally





- Psychiatric


Psychiatric: appropriate mood/affect, cooperative





- Neurologic


Neurologic: CNII-XII intact, moves all extremities





Plan


Activity: advance as tolerated


Diet: low salt


Special Instructions: smoking cessation


Additional Instructions: Advice Smoking cessation.  If you continue to have 

recurrent chest pain, contact M.D. go to emergency room


Follow up with: 


WENDI CORTEZ MD [Primary Care Provider] - 3-5 Days


AMADA SANCHEZ MD [Staff Physician] - 7 Days


Prescriptions: 


ALBUTEROL Inhaler(NF) [VENTOLIN Inhaler(NF)] 2 puff IH QID PRN #1 inha


 PRN Reason: Shortness Of Breath


Famotidine [Pepcid] 20 mg PO BID #30 tablet


Fluticasone/Salmeterol [Advair 250-50 Diskus] 1 each IH BID #1 blst.w.dev


levoFLOXacin [Levaquin TAB] 750 mg PO Q24HR@2200 #5 tablet


Nicotine [Habitrol] 14 mg TD DAILY #30 patch


Prednisone [predniSONE 10 mg (6-Day Pack, 21 Tabs)] 10 mg PO .TAPER #1 tab.ds.pk